# Patient Record
Sex: MALE | URBAN - METROPOLITAN AREA
[De-identification: names, ages, dates, MRNs, and addresses within clinical notes are randomized per-mention and may not be internally consistent; named-entity substitution may affect disease eponyms.]

---

## 2020-04-16 ENCOUNTER — COMMUNICATION - HEALTHEAST (OUTPATIENT)
Dept: SCHEDULING | Facility: CLINIC | Age: 75
End: 2020-04-16

## 2021-06-07 NOTE — TELEPHONE ENCOUNTER
RN triage -   Call from pt    pt wants information on covid 19 testing and antibody testing   Pt states dx w/ bronchitis 1 week ago taking antibx --feeling better  Also has newly dx palpitations -- has had some increased episodes -- no palpitations now   Pt states his doctor is treating /managing palpitations -- and pt will call Dr/ be seen if having increased palpitations or worsening episode.    Reviewed current advice re: testing   Selbe Keys RN [facility] RN triage    Reason for Disposition    COVID-19 Testing, questions about    Protocols used: CORONAVIRUS (COVID-19) DIAGNOSED OR [id number] 3.30.20